# Patient Record
(demographics unavailable — no encounter records)

---

## 2017-06-05 NOTE — REPUSA
Clinical history: wound along the lateral right elbow.

Findings: Real-time ultrasound imaging of the right  elbow was performed. Normal heterogeneous fibrog
landular tissue is noted. There is a cavity in the subcutaneous tissues, open to the surface.This lik
ely represents a formerly expressed subcutaneous abscess. No otherabscess is identified. No focal def
ined mass or cystic lesion is appreciated. No evidence of calcifications are appreciated. No other gr
oss abnormalities.

Impression: No acute master loculated fluid collection. No evidence of an acute abscess. Chronic reso
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

     Electronically signed by MICHELE SCHMID MD on 06/02/2017 08:44:26 PM ET